# Patient Record
Sex: MALE | Race: BLACK OR AFRICAN AMERICAN | NOT HISPANIC OR LATINO | Employment: OTHER | URBAN - METROPOLITAN AREA
[De-identification: names, ages, dates, MRNs, and addresses within clinical notes are randomized per-mention and may not be internally consistent; named-entity substitution may affect disease eponyms.]

---

## 2018-07-22 ENCOUNTER — HOSPITAL ENCOUNTER (EMERGENCY)
Facility: HOSPITAL | Age: 63
Discharge: HOME | End: 2018-07-22
Attending: EMERGENCY MEDICINE
Payer: MEDICARE

## 2018-07-22 VITALS
HEIGHT: 69 IN | RESPIRATION RATE: 17 BRPM | SYSTOLIC BLOOD PRESSURE: 146 MMHG | TEMPERATURE: 98 F | DIASTOLIC BLOOD PRESSURE: 85 MMHG | BODY MASS INDEX: 29.62 KG/M2 | OXYGEN SATURATION: 97 % | WEIGHT: 200 LBS | HEART RATE: 62 BPM

## 2018-07-22 DIAGNOSIS — T39.395A GASTRITIS DUE TO NONSTEROIDAL ANTI-INFLAMMATORY DRUG (NSAID): Primary | ICD-10-CM

## 2018-07-22 DIAGNOSIS — E86.0 DEHYDRATION: ICD-10-CM

## 2018-07-22 DIAGNOSIS — K29.60 GASTRITIS DUE TO NONSTEROIDAL ANTI-INFLAMMATORY DRUG (NSAID): Primary | ICD-10-CM

## 2018-07-22 DIAGNOSIS — N17.9 AKI (ACUTE KIDNEY INJURY): ICD-10-CM

## 2018-07-22 LAB
ALBUMIN SERPL BCP-MCNC: 4 G/DL
ALP SERPL-CCNC: 80 U/L
ALT SERPL W/O P-5'-P-CCNC: 24 U/L
ANION GAP SERPL CALC-SCNC: 9 MMOL/L
AST SERPL-CCNC: 22 U/L
BASOPHILS # BLD AUTO: 0.01 K/UL
BASOPHILS NFR BLD: 0.2 %
BILIRUB SERPL-MCNC: 0.3 MG/DL
BUN SERPL-MCNC: 22 MG/DL
CALCIUM SERPL-MCNC: 9.7 MG/DL
CHLORIDE SERPL-SCNC: 106 MMOL/L
CO2 SERPL-SCNC: 26 MMOL/L
CREAT SERPL-MCNC: 1.8 MG/DL
DIFFERENTIAL METHOD: ABNORMAL
EOSINOPHIL # BLD AUTO: 0.2 K/UL
EOSINOPHIL NFR BLD: 3.9 %
ERYTHROCYTE [DISTWIDTH] IN BLOOD BY AUTOMATED COUNT: 14.7 %
EST. GFR  (AFRICAN AMERICAN): 46 ML/MIN/1.73 M^2
EST. GFR  (NON AFRICAN AMERICAN): 39 ML/MIN/1.73 M^2
GLUCOSE SERPL-MCNC: 98 MG/DL
HCT VFR BLD AUTO: 43.7 %
HGB BLD-MCNC: 15 G/DL
LIPASE SERPL-CCNC: 48 U/L
LYMPHOCYTES # BLD AUTO: 1.8 K/UL
LYMPHOCYTES NFR BLD: 31.4 %
MCH RBC QN AUTO: 32.1 PG
MCHC RBC AUTO-ENTMCNC: 34.3 G/DL
MCV RBC AUTO: 94 FL
MONOCYTES # BLD AUTO: 0.5 K/UL
MONOCYTES NFR BLD: 9.5 %
NEUTROPHILS # BLD AUTO: 3.1 K/UL
NEUTROPHILS NFR BLD: 55 %
PLATELET # BLD AUTO: 267 K/UL
PMV BLD AUTO: 8.9 FL
POTASSIUM SERPL-SCNC: 4.5 MMOL/L
PROT SERPL-MCNC: 8 G/DL
RBC # BLD AUTO: 4.67 M/UL
SODIUM SERPL-SCNC: 141 MMOL/L
WBC # BLD AUTO: 5.66 K/UL

## 2018-07-22 PROCEDURE — 99285 EMERGENCY DEPT VISIT HI MDM: CPT | Mod: 25

## 2018-07-22 PROCEDURE — 96374 THER/PROPH/DIAG INJ IV PUSH: CPT

## 2018-07-22 PROCEDURE — S0028 INJECTION, FAMOTIDINE, 20 MG: HCPCS | Performed by: EMERGENCY MEDICINE

## 2018-07-22 PROCEDURE — 80053 COMPREHEN METABOLIC PANEL: CPT

## 2018-07-22 PROCEDURE — 85025 COMPLETE CBC W/AUTO DIFF WBC: CPT

## 2018-07-22 PROCEDURE — 96375 TX/PRO/DX INJ NEW DRUG ADDON: CPT

## 2018-07-22 PROCEDURE — 63600175 PHARM REV CODE 636 W HCPCS: Performed by: EMERGENCY MEDICINE

## 2018-07-22 PROCEDURE — 25000003 PHARM REV CODE 250: Performed by: EMERGENCY MEDICINE

## 2018-07-22 PROCEDURE — 83690 ASSAY OF LIPASE: CPT

## 2018-07-22 RX ORDER — FAMOTIDINE 10 MG/ML
20 INJECTION INTRAVENOUS
Status: COMPLETED | OUTPATIENT
Start: 2018-07-22 | End: 2018-07-22

## 2018-07-22 RX ORDER — ONDANSETRON 2 MG/ML
4 INJECTION INTRAMUSCULAR; INTRAVENOUS
Status: COMPLETED | OUTPATIENT
Start: 2018-07-22 | End: 2018-07-22

## 2018-07-22 RX ORDER — NAPROXEN 500 MG/1
500 TABLET ORAL 2 TIMES DAILY
COMMUNITY

## 2018-07-22 RX ORDER — SUCRALFATE 1 G/1
1 TABLET ORAL 4 TIMES DAILY
Qty: 56 TABLET | Refills: 0 | Status: SHIPPED | OUTPATIENT
Start: 2018-07-22 | End: 2018-08-05

## 2018-07-22 RX ADMIN — ONDANSETRON HYDROCHLORIDE 4 MG: 2 INJECTION, SOLUTION INTRAMUSCULAR; INTRAVENOUS at 09:07

## 2018-07-22 RX ADMIN — FAMOTIDINE 20 MG: 10 INJECTION INTRAVENOUS at 09:07

## 2018-07-22 RX ADMIN — LIDOCAINE HYDROCHLORIDE: 20 SOLUTION ORAL; TOPICAL at 09:07

## 2018-07-23 NOTE — ED PROVIDER NOTES
"Encounter Date: 7/22/2018    SCRIBE #1 NOTE: I, Angelica Quintanilla, am scribing for, and in the presence of,  Shan Barkley MD. I have scribed the following portions of the note - Other sections scribed: ROS and HPI.       History     Chief Complaint   Patient presents with    epigastric pain     reports intermittent pigastric pain x 1mth     CC: Abdominal Pain    HPI: This 62 y.o. male with no past medical history on file, presents to the ED complaining of an acute onset, intermittent epigastric abdominal pain for past x1 month. Pain is 7/10 at its worst. He [facility] He can not recall the name of it. He notes he has been taking naproxen for his pain. Denies N/V/D or any other associated sx.       The history is provided by the patient. No  was used.     Review of patient's allergies indicates:  No Known Allergies  Past Medical History:   Diagnosis Date    GERD (gastroesophageal reflux disease)     Nerve disorder      Past Surgical History:   Procedure Laterality Date    CHOLECYSTECTOMY  1998     History reviewed. No pertinent family history.  Social History   Substance Use Topics    Smoking status: Current Every Day Smoker     Packs/day: 0.50     Types: Cigarettes    Smokeless tobacco: Never Used    Alcohol use No      Comment: recently quit daily drinking 5months ago     Review of Systems   Constitutional: Negative for chills and fever.   HENT: Negative for congestion, ear pain, rhinorrhea and sore throat.    Eyes: Negative for pain and visual disturbance.   Respiratory: Negative for cough and shortness of breath.    Cardiovascular: Negative for chest pain.   Gastrointestinal: Positive for abdominal pain (epigastric). Negative for diarrhea, nausea and vomiting.   Genitourinary: Negative for dysuria.   Musculoskeletal: Negative for back pain and neck pain.   Skin: Negative for rash.   Neurological: Negative for headaches.       Physical Exam "     Initial Vitals [07/22/18 2017]   BP Pulse Resp Temp SpO2   (!) 153/84 83 19 99 °F (37.2 °C) 96 %      MAP       --         Physical Exam    Vitals reviewed.  Constitutional: He appears well-developed and well-nourished.   HENT:   Head: Normocephalic and atraumatic.   Eyes: EOM are normal. Pupils are equal, round, and reactive to light.   Neck: Normal range of motion. Neck supple.   Cardiovascular: Normal rate, regular rhythm, normal heart sounds and intact distal pulses.   Pulmonary/Chest: Breath sounds normal. No respiratory distress. He has no wheezes. He has no rhonchi. He has no rales.   Abdominal: Soft. Bowel sounds are normal. There is no hepatosplenomegaly. There is tenderness in the epigastric area. There is no rigidity, no rebound, no guarding, no CVA tenderness, no tenderness at McBurney's point and negative Ferguson's sign. No hernia.   Musculoskeletal: Normal range of motion.   Neurological: He is alert and oriented to person, place, and time.   Skin: Skin is warm and dry.   Psychiatric: He has a normal mood and affect.         ED Course   Procedures  Labs Reviewed   CBC W/ AUTO DIFFERENTIAL - Abnormal; Notable for the following:        Result Value    MCH 32.1 (*)     RDW 14.7 (*)     MPV 8.9 (*)     All other components within normal limits   COMPREHENSIVE METABOLIC PANEL - Abnormal; Notable for the following:     Creatinine 1.8 (*)     eGFR if  46 (*)     eGFR if non  39 (*)     All other components within normal limits   LIPASE          Imaging Results          X-Ray Abdomen Flat And Erect (Final result)  Result time 07/22/18 20:58:05    Final result by Kenya Mullen MD (07/22/18 20:58:05)                 Impression:      Nonobstructive bowel gas pattern.      Electronically signed by: Kenya Mullen MD  Date:    07/22/2018  Time:    20:58             Narrative:    EXAMINATION:  XR ABDOMEN FLAT AND ERECT    CLINICAL HISTORY:  Abdominal Pain;    TECHNIQUE:  Flat  and erect AP views of the abdomen were performed.    COMPARISON:  None    FINDINGS:  Nonspecific bowel gas pattern.  No evidence to suggest obstruction.  No free air identified.  Clip is seen within the right upper quadrant presumably related to prior cholecystectomy.  Moderate stool is seen within the colon.  Partially visualized lung bases are clear.                                 Medical Decision Making:   History:   Old Medical Records: I decided to obtain old medical records.  Initial Assessment:   Medical decision-making:    The patient received a medical screening exam. If performed, the EKG was independently evaluated by me and is pending final cardiology evaluation.  If performed, all radiographic studies were independently evaluated by me and are pending final radiology evaluation. If labs were ordered, they were reviewed. Vital signs are independently assessed by me.  If performed, the pulse oximetry was independently evaluated by me.  I decided to obtain the patient's past medical record.  If available, I reviewed the patient's past medical record, including most recent labs and radiology reports.    ED Management:  This is an emergent evaluation of the patient complaining of abdominal pain.  My differential diagnosis includes mesenteric ischemia, obstruction, appendicitis, pancreatitis, cholecystitis, peptic ulcer disease,diverticulitis, colitis, volvulus, hernia, UTI, gastritis and gastroenteritis.    I decided to obtain and review the old medical record.    The patient DOES clinically have gastritis, and may have PUD. Will have him stop NSAIDs.  I consider, but doubt based on HPI and PE  mesenteric ischemia, obstruction, appendicitis, cholecystitis, diverticulitis, colitis, and hernia.  The patient's urine is not infected.  The patient's labs are otherwise normal except for mild KIANA. States that he took a bike ride out in the heat today and did not drink any water. Will encourage hydration and follow  up with PCP to recheck Cr.     The patient reports that symptomatically, symptoms have improved.    The patient will be discharged home with the following medications:  Patient will follow up with primary care physician.    The results and physical exam findings were reviewed with the patient. Pt agrees with assessment, disposition and treatment plan and has no further questions or complaints at this time.      AYAN Barkley M.D. 9:58 PM 7/22/2018                  Scribe Attestation:   Scribe #1: I performed the above scribed service and the documentation accurately describes the services I performed. I attest to the accuracy of the note.    Attending Attestation:           Physician Attestation for Scribe:  Physician Attestation Statement for Scribe #1: I, Shan Barkley MD, reviewed documentation, as scribed by Angelica Quintanilla in my presence, and it is both accurate and complete.                    Clinical Impression:   The primary encounter diagnosis was Gastritis due to nonsteroidal anti-inflammatory drug (NSAID). Diagnoses of Dehydration and KIANA (acute kidney injury) were also pertinent to this visit.                             Shan Barkley MD  07/22/18 4857       Shan Barkley MD  07/22/18 [id number]

## 2018-07-23 NOTE — ED TRIAGE NOTES
Pt arrives via ems with c/o upper abdominal pain that has been recurrent for a couple a months. Pt only reports taking naproxen daily. Pt is alert, oriented and ambulatory on arrival. NADN at this time

## 2018-11-12 ENCOUNTER — HOSPITAL ENCOUNTER (EMERGENCY)
Facility: HOSPITAL | Age: 63
Discharge: HOME | End: 2018-11-12
Attending: EMERGENCY MEDICINE
Payer: MEDICARE

## 2018-11-12 VITALS
DIASTOLIC BLOOD PRESSURE: 89 MMHG | TEMPERATURE: 98 F | OXYGEN SATURATION: 98 % | HEIGHT: 69 IN | SYSTOLIC BLOOD PRESSURE: 138 MMHG | BODY MASS INDEX: 25.92 KG/M2 | WEIGHT: 175 LBS | RESPIRATION RATE: 18 BRPM | HEART RATE: 95 BPM

## 2018-11-12 DIAGNOSIS — G47.00 INSOMNIA: ICD-10-CM

## 2018-11-12 LAB
ALBUMIN SERPL BCP-MCNC: 4 G/DL
ALP SERPL-CCNC: 73 U/L
ALT SERPL W/O P-5'-P-CCNC: 14 U/L
ANION GAP SERPL CALC-SCNC: 10 MMOL/L
AST SERPL-CCNC: 21 U/L
BASOPHILS # BLD AUTO: 0.02 K/UL
BASOPHILS NFR BLD: 0.3 %
BILIRUB SERPL-MCNC: 0.3 MG/DL
BILIRUB UR QL STRIP: NEGATIVE
BUN SERPL-MCNC: 23 MG/DL
CALCIUM SERPL-MCNC: 9.9 MG/DL
CHLORIDE SERPL-SCNC: 111 MMOL/L
CLARITY UR: CLEAR
CO2 SERPL-SCNC: 22 MMOL/L
COLOR UR: YELLOW
CREAT SERPL-MCNC: 1.6 MG/DL
DIFFERENTIAL METHOD: ABNORMAL
EOSINOPHIL # BLD AUTO: 0.2 K/UL
EOSINOPHIL NFR BLD: 3.1 %
ERYTHROCYTE [DISTWIDTH] IN BLOOD BY AUTOMATED COUNT: 14.1 %
EST. GFR  (AFRICAN AMERICAN): 52 ML/MIN/1.73 M^2
EST. GFR  (NON AFRICAN AMERICAN): 45 ML/MIN/1.73 M^2
GLUCOSE SERPL-MCNC: 105 MG/DL
GLUCOSE UR QL STRIP: NEGATIVE
HCT VFR BLD AUTO: 40.7 %
HGB BLD-MCNC: 14.2 G/DL
HGB UR QL STRIP: NEGATIVE
KETONES UR QL STRIP: NEGATIVE
LEUKOCYTE ESTERASE UR QL STRIP: NEGATIVE
LYMPHOCYTES # BLD AUTO: 2.7 K/UL
LYMPHOCYTES NFR BLD: 34.4 %
MCH RBC QN AUTO: 31.9 PG
MCHC RBC AUTO-ENTMCNC: 34.9 G/DL
MCV RBC AUTO: 92 FL
MONOCYTES # BLD AUTO: 0.6 K/UL
MONOCYTES NFR BLD: 7.4 %
NEUTROPHILS # BLD AUTO: 4.3 K/UL
NEUTROPHILS NFR BLD: 54.8 %
NITRITE UR QL STRIP: NEGATIVE
PH UR STRIP: 5 [PH] (ref 5–8)
PLATELET # BLD AUTO: 257 K/UL
PMV BLD AUTO: 9.2 FL
POTASSIUM SERPL-SCNC: 3.9 MMOL/L
PROT SERPL-MCNC: 7.4 G/DL
PROT UR QL STRIP: NEGATIVE
RBC # BLD AUTO: 4.45 M/UL
SODIUM SERPL-SCNC: 143 MMOL/L
SP GR UR STRIP: 1.02 (ref 1–1.03)
TROPONIN I SERPL DL<=0.01 NG/ML-MCNC: <0.006 NG/ML
TSH SERPL DL<=0.005 MIU/L-ACNC: 2.62 UIU/ML
URN SPEC COLLECT METH UR: NORMAL
UROBILINOGEN UR STRIP-ACNC: NEGATIVE EU/DL
WBC # BLD AUTO: 7.85 K/UL

## 2018-11-12 PROCEDURE — 93010 ELECTROCARDIOGRAM REPORT: CPT | Mod: ,,, | Performed by: INTERNAL MEDICINE

## 2018-11-12 PROCEDURE — 84443 ASSAY THYROID STIM HORMONE: CPT

## 2018-11-12 PROCEDURE — 81003 URINALYSIS AUTO W/O SCOPE: CPT

## 2018-11-12 PROCEDURE — 85025 COMPLETE CBC W/AUTO DIFF WBC: CPT

## 2018-11-12 PROCEDURE — 93005 ELECTROCARDIOGRAM TRACING: CPT

## 2018-11-12 PROCEDURE — 80053 COMPREHEN METABOLIC PANEL: CPT

## 2018-11-12 PROCEDURE — 99284 EMERGENCY DEPT VISIT MOD MDM: CPT

## 2018-11-12 PROCEDURE — 84484 ASSAY OF TROPONIN QUANT: CPT

## 2018-11-12 RX ORDER — NAPROXEN SODIUM 220 MG/1
81 TABLET, FILM COATED ORAL DAILY
COMMUNITY

## 2018-11-12 RX ORDER — AMLODIPINE BESYLATE 10 MG/1
10 TABLET ORAL DAILY
COMMUNITY

## 2018-11-12 RX ORDER — ZOLPIDEM TARTRATE 5 MG/1
5 TABLET ORAL NIGHTLY PRN
Qty: 10 TABLET | Refills: 0 | Status: SHIPPED | OUTPATIENT
Start: 2018-11-12 | End: 2019-05-13

## 2018-11-12 RX ORDER — QUETIAPINE 400 MG/1
TABLET, FILM COATED, EXTENDED RELEASE ORAL NIGHTLY
COMMUNITY

## 2018-11-12 RX ORDER — GABAPENTIN 400 MG/1
400 CAPSULE ORAL 2 TIMES DAILY
COMMUNITY

## 2018-11-13 NOTE — DISCHARGE INSTRUCTIONS
You were seen in the emergency department for difficulty sleeping.  Your labs and exam are reassuring.  We have given you a prescription for a short course of Ambien.  This may help you sleep.  Please take your Seroquel as directed.  Please note, this medication can make you sleepy and somewhat dizzy.  Start by taking half a tab until you are familiar with its affects.  Please do not drink, drive, make important decisions, operate heavy machinery, or supervise children by yourself while on this medication. Please follow up with your primary care provider this week. Please return for any new or worsening chest pain, nausea, vomiting, difficulty breathing, coughing up blood, profuse sweating, dizziness, lightheadedness, numbness, weakness, or any other new or worsening concerns.

## 2018-11-13 NOTE — ED TRIAGE NOTES
Pt arrives to er via ems [id number] from [facility]. Pt reports he has been awake for 2 days, have a lot gas

## 2018-11-13 NOTE — ED PROVIDER NOTES
"Encounter Date: 11/12/2018    SCRIBE #1 NOTE: I, Eyad Bee, am scribing for, and in the presence of,  Rodolfo Alicia MD . I have scribed the following portions of the note - Other sections scribed: HPI, ROS, PE .       History     Chief Complaint   Patient presents with    Insomnia     " I havent been able to sleep in two days, my seroquel isnt working for me."      CC: Insomnia         63 y.o. Male with a past medical history of GERD, Hypertension, Insomnia, and Nerve disorder presents to ED for emergent evaluation of acute onset insomnia for the past 2x days. Patient reports taking 3x half tablets of Seroquel today as prescribed by physician, with no relief. Patient notes attending an AA program for [facility] where he became fatigued and dizzy after having a verbal disagreement with a fellow participant. He also notes tobacco abuse and restraining from drinking EtOH for the [id number] months. Patient denies experiencing pain, blurry vision, slurred speech, numbness, drug abuse, early family history of disease, abdominal pain, and leg swelling. No other associated symptoms.           The history is provided by the patient. No  was used.     Review of patient's allergies indicates:   Allergen Reactions    Sulfa (sulfonamide antibiotics) Rash     Past Medical History:   Diagnosis Date    GERD (gastroesophageal reflux disease)     Hypertension     Insomnia     Nerve disorder      Past Surgical History:   Procedure Laterality Date    CHOLECYSTECTOMY  1998     History reviewed. No pertinent family history.  Social History     Tobacco Use    Smoking status: Current Every Day Smoker     Packs/day: 1.00     Types: Cigarettes    Smokeless tobacco: Never Used   Substance Use Topics    Alcohol use: No     Comment: stop 1 yr    Drug use: No     Review of Systems   Constitutional: Positive for fatigue. Negative for appetite change and fever.   HENT: Negative for rhinorrhea and sore " throat.         (+) insomnia    Eyes: Negative for visual disturbance.   Respiratory: Negative for cough and shortness of breath.    Cardiovascular: Negative for chest pain and leg swelling.   Gastrointestinal: Negative for abdominal pain.   Genitourinary: Negative for dysuria.   Musculoskeletal: Negative for gait problem.   Skin: Negative for rash.   Neurological: Positive for dizziness. Negative for syncope and numbness.   Psychiatric/Behavioral: Positive for sleep disturbance. Negative for agitation, confusion and hallucinations. The patient is not nervous/anxious and is not hyperactive.        Physical Exam     Initial Vitals [11/12/18 1947]   BP Pulse Resp Temp SpO2   (!) 142/77 (!) 113 16 98 °F (36.7 °C) 96 %      MAP       --         Physical Exam    Nursing note and vitals reviewed.  Constitutional: He appears well-developed and well-nourished. He is not diaphoretic. No distress.   HENT:   Head: Normocephalic and atraumatic.   Mouth/Throat: Oropharynx is clear and moist.   Eyes: Conjunctivae and EOM are normal. Pupils are equal, round, and reactive to light. No scleral icterus.   Neck: Normal range of motion. Neck supple. No JVD present.   Cardiovascular: Normal rate, regular rhythm, normal heart sounds and intact distal pulses. Exam reveals no gallop and no friction rub.    No murmur heard.  Pulmonary/Chest: Breath sounds normal. No stridor. No respiratory distress. He has no wheezes. He has no rhonchi. He has no rales.   Abdominal: Soft. Bowel sounds are normal. He exhibits no distension. There is no tenderness. There is no rebound and no guarding.   Musculoskeletal: Normal range of motion. He exhibits no edema or tenderness.   Neurological: He is alert and oriented to person, place, and time. He has normal strength and normal reflexes. He displays normal reflexes. No cranial nerve deficit or sensory deficit. GCS score is 15. GCS eye subscore is 4. GCS verbal subscore is 5. GCS motor subscore is 6.    Normal finger to nose, heel to shin, normal gait, normal speech and phonation, no pronator drift.     Skin: Skin is warm and dry. No rash noted.   Psychiatric: He has a normal mood and affect. His behavior is normal.         ED Course   Procedures  Labs Reviewed   CBC W/ AUTO DIFFERENTIAL - Abnormal; Notable for the following components:       Result Value    RBC 4.45 (*)     MCH 31.9 (*)     All other components within normal limits   COMPREHENSIVE METABOLIC PANEL - Abnormal; Notable for the following components:    Chloride 111 (*)     CO2 22 (*)     Creatinine 1.6 (*)     eGFR if  52 (*)     eGFR if non  45 (*)     All other components within normal limits   URINALYSIS, REFLEX TO URINE CULTURE    Narrative:     Preferred Collection Type->Urine, Clean Catch   TROPONIN I   TSH          Imaging Results    None          Medical Decision Making:   Initial Assessment:   63-year-old male with a history of alcoholism, hypertension, hyperlipidemia presenting with 2 days of insomnia, inability to sleep.  Patient states today after having difficulty sleeping the last 2 days, he got into an argument with a fellow rehab patient and afterwards felt fatigued and slightly dizzy.  Denies vertiginous feelings, changes in vision, changes in hearing, numbness, weakness.  Denies pain, chest pain, abdominal pain, nausea, vomiting. He states he takes Seroquel, half tablet per day and attempted to take an extra dose today.  He currently has no complaints. At triage, patient had mild tachycardia.  Resolved on my exam.  Vitals otherwise normal.  Last drink was 9 months ago.  The patient is currently in an alcohol rehab facility.  I doubt acute alcohol withdrawal.  Symptoms are atypical for chest pain or ACS.  His neuro exam is completely normal without any evidence of focal deficits.  I doubt stroke.  We will get labs, to include TSH, and reassess.  If no abnormalities, will discharge him with a  prescription for a short course of Ambien.  ED Management:  Labs unremarkable. CKD at baseline.  EKG shows mild sinus tachycardia, rate 109.  No significant ST segment elevation or depression concerning for acute ischemia.  Troponin negative, though the patient has no significant symptoms of chest pain atypical or otherwise.  Symptoms ongoing for 2 days.  Regardless, heart score 3 or lower and without significant family history.  Doubt PE.  No other evidence of infection.  Believe the patient is safe for discharge home.  Small prescription for Ambien prescribed with return precautions and precautions regarding Ambien usage and hazards discussed.            Scribe Attestation:   Scribe #1: I performed the above scribed service and the documentation accurately describes the services I performed. I attest to the accuracy of the note.    Attending Attestation:           Physician Attestation for Scribe:  Physician Attestation Statement for Scribe #1: I, Roodlfo Alicia MD , reviewed documentation, as scribed by Eyad Bee in my presence, and it is both accurate and complete.                    Clinical Impression:   The encounter diagnosis was Insomnia.      Disposition:   Disposition: Discharged  Condition: Stable                        Rodolfo Alicia MD  11/12/18 4522